# Patient Record
Sex: FEMALE | Race: BLACK OR AFRICAN AMERICAN | NOT HISPANIC OR LATINO
[De-identification: names, ages, dates, MRNs, and addresses within clinical notes are randomized per-mention and may not be internally consistent; named-entity substitution may affect disease eponyms.]

---

## 2018-02-13 ENCOUNTER — RESULT REVIEW (OUTPATIENT)
Age: 38
End: 2018-02-13

## 2018-08-30 ENCOUNTER — APPOINTMENT (OUTPATIENT)
Dept: GASTROENTEROLOGY | Facility: CLINIC | Age: 38
End: 2018-08-30
Payer: COMMERCIAL

## 2018-08-30 VITALS
TEMPERATURE: 98.1 F | RESPIRATION RATE: 16 BRPM | HEIGHT: 61 IN | BODY MASS INDEX: 36.06 KG/M2 | WEIGHT: 191 LBS | HEART RATE: 70 BPM | OXYGEN SATURATION: 97 % | SYSTOLIC BLOOD PRESSURE: 127 MMHG | DIASTOLIC BLOOD PRESSURE: 69 MMHG

## 2018-08-30 PROCEDURE — 99204 OFFICE O/P NEW MOD 45 MIN: CPT

## 2018-08-30 RX ORDER — IRON SUCROSE 20 MG/ML
20 INJECTION, SOLUTION INTRAVENOUS
Qty: 50 | Refills: 0 | Status: ACTIVE | COMMUNITY
Start: 2018-07-03

## 2018-08-30 RX ORDER — TERCONAZOLE 8 MG/G
0.8 CREAM VAGINAL
Qty: 20 | Refills: 0 | Status: ACTIVE | COMMUNITY
Start: 2018-06-26

## 2018-08-30 RX ORDER — NYSTATIN 100000 [USP'U]/G
100000 CREAM TOPICAL
Qty: 30 | Refills: 0 | Status: ACTIVE | COMMUNITY
Start: 2018-06-29

## 2018-08-30 RX ORDER — POLYETHYLENE GLYCOL 3350 17 G/17G
17 POWDER, FOR SOLUTION ORAL
Qty: 238 | Refills: 0 | Status: ACTIVE | COMMUNITY
Start: 2018-08-30 | End: 1900-01-01

## 2018-08-30 RX ORDER — FLUCONAZOLE 150 MG/1
150 TABLET ORAL
Qty: 1 | Refills: 0 | Status: ACTIVE | COMMUNITY
Start: 2018-06-29

## 2018-08-30 RX ORDER — FAMOTIDINE 20 MG/1
20 TABLET, FILM COATED ORAL
Qty: 30 | Refills: 0 | Status: ACTIVE | COMMUNITY
Start: 2018-04-06

## 2018-10-08 ENCOUNTER — RESULT REVIEW (OUTPATIENT)
Age: 38
End: 2018-10-08

## 2018-10-08 ENCOUNTER — OUTPATIENT (OUTPATIENT)
Dept: OUTPATIENT SERVICES | Facility: HOSPITAL | Age: 38
LOS: 1 days | End: 2018-10-08
Payer: COMMERCIAL

## 2018-10-08 DIAGNOSIS — D64.9 ANEMIA, UNSPECIFIED: ICD-10-CM

## 2018-10-08 DIAGNOSIS — Z98.89 OTHER SPECIFIED POSTPROCEDURAL STATES: Chronic | ICD-10-CM

## 2018-10-08 DIAGNOSIS — Z98.84 BARIATRIC SURGERY STATUS: Chronic | ICD-10-CM

## 2018-10-08 LAB — HCG UR QL: NEGATIVE — SIGNIFICANT CHANGE UP

## 2018-10-08 PROCEDURE — 88312 SPECIAL STAINS GROUP 1: CPT | Mod: 26

## 2018-10-08 PROCEDURE — 88305 TISSUE EXAM BY PATHOLOGIST: CPT | Mod: 26

## 2018-10-08 PROCEDURE — 43239 EGD BIOPSY SINGLE/MULTIPLE: CPT

## 2018-10-08 PROCEDURE — 44361 SMALL BOWEL ENDOSCOPY/BIOPSY: CPT

## 2018-10-08 PROCEDURE — 81025 URINE PREGNANCY TEST: CPT

## 2018-10-08 PROCEDURE — 45378 DIAGNOSTIC COLONOSCOPY: CPT

## 2018-10-08 PROCEDURE — 88312 SPECIAL STAINS GROUP 1: CPT

## 2018-10-08 PROCEDURE — 88305 TISSUE EXAM BY PATHOLOGIST: CPT

## 2020-06-17 ENCOUNTER — RESULT REVIEW (OUTPATIENT)
Age: 40
End: 2020-06-17

## 2021-03-02 ENCOUNTER — APPOINTMENT (OUTPATIENT)
Dept: PLASTIC SURGERY | Facility: CLINIC | Age: 41
End: 2021-03-02

## 2021-03-25 ENCOUNTER — APPOINTMENT (OUTPATIENT)
Dept: PLASTIC SURGERY | Facility: CLINIC | Age: 41
End: 2021-03-25
Payer: COMMERCIAL

## 2021-03-25 VITALS
WEIGHT: 185 LBS | DIASTOLIC BLOOD PRESSURE: 61 MMHG | SYSTOLIC BLOOD PRESSURE: 110 MMHG | HEART RATE: 75 BPM | HEIGHT: 61 IN | OXYGEN SATURATION: 98 % | TEMPERATURE: 97.5 F | BODY MASS INDEX: 34.93 KG/M2

## 2021-03-25 DIAGNOSIS — N62 HYPERTROPHY OF BREAST: ICD-10-CM

## 2021-03-25 DIAGNOSIS — N64.89 OTHER SPECIFIED DISORDERS OF BREAST: ICD-10-CM

## 2021-03-25 DIAGNOSIS — M25.519 PAIN IN UNSPECIFIED SHOULDER: ICD-10-CM

## 2021-03-25 DIAGNOSIS — M54.9 DORSALGIA, UNSPECIFIED: ICD-10-CM

## 2021-03-25 PROCEDURE — 99203 OFFICE O/P NEW LOW 30 MIN: CPT

## 2021-06-03 PROBLEM — M25.519 SHOULDER PAIN: Status: ACTIVE | Noted: 2021-06-03

## 2021-06-03 PROBLEM — M54.9 BACK PAIN: Status: ACTIVE | Noted: 2021-06-03

## 2021-06-03 NOTE — HISTORY OF PRESENT ILLNESS
[FreeTextEntry1] : Ms. ARTEMIO BOYER is a 41 yo female who presents with bilateral macromastia complaining of bilateral shoulder, neck, and back pain for many years.  She states that she has tried an array of supportive bras, including expensive specialty bras, and none of these have helped her.  She has tried Tylenol and ibuprofen which have not provided any relief.  She complains of severe asymmetry of her breasts making it difficult for her to wear clothing.   She complains that her large breasts cause her constant daily pain and affect her activities of daily life including making running, exercising, and many other activities very painful.  She wears a 38DD bra.  She has no family history of early breast cancer.  She is a non-smoker.  She has no previous history of any breast problems or breast surgeries.  She has no history of traumatic injuries or back surgery that could explain her complaints.  \par \par

## 2021-06-03 NOTE — ASSESSMENT
[FreeTextEntry1] : 41 yo female with intractable back, neck, and bilateral shoulder pain, breast asymmetry secondary to large ptotic breasts that interfere with the patient’s activities of normal daily living.  I feel that she is a good candidate for a breast reduction.  \par \par The patient was counseled on the risks, benefits and alternatives of bilateral breast reduction including the risks of infection, bleeding, seroma, hematoma, fat necrosis, decreased/increased /loss of nipple sensation, nipple necrosis, skin flap necrosis, fat necrosis, dehiscence, asymmetry.  The patient understands the risks, all questions were answered and the patient gave informed consent.  The patient wishes to proceed with surgery and will be scheduled for bilateral breast reduction.\par \par

## 2021-06-17 RX ORDER — OXYCODONE 5 MG/1
5 TABLET ORAL
Qty: 10 | Refills: 0 | Status: ACTIVE | COMMUNITY
Start: 2021-06-17 | End: 1900-01-01

## 2021-06-21 ENCOUNTER — APPOINTMENT (OUTPATIENT)
Dept: PLASTIC SURGERY | Facility: AMBULATORY SURGERY CENTER | Age: 41
End: 2021-06-21
Payer: COMMERCIAL

## 2021-06-21 ENCOUNTER — RESULT REVIEW (OUTPATIENT)
Age: 41
End: 2021-06-21

## 2021-06-21 PROCEDURE — 19318 BREAST REDUCTION: CPT | Mod: 50

## 2021-06-29 ENCOUNTER — APPOINTMENT (OUTPATIENT)
Dept: PLASTIC SURGERY | Facility: CLINIC | Age: 41
End: 2021-06-29
Payer: COMMERCIAL

## 2021-06-29 VITALS
WEIGHT: 174.04 LBS | SYSTOLIC BLOOD PRESSURE: 121 MMHG | HEIGHT: 61 IN | HEART RATE: 77 BPM | OXYGEN SATURATION: 98 % | TEMPERATURE: 97.4 F | BODY MASS INDEX: 32.86 KG/M2 | DIASTOLIC BLOOD PRESSURE: 81 MMHG

## 2021-06-29 PROCEDURE — 99024 POSTOP FOLLOW-UP VISIT: CPT

## 2021-06-29 NOTE — ASSESSMENT
[FreeTextEntry1] : 41 yo female s/p b/l breast reduction 6/21/21\par monitor for redness, swelling, fever, chills\par no heavy lifting or strenuous activity\par continue to wear soft, non wire bra\par all pt questions answered\par drains removed today without difficulty

## 2021-06-29 NOTE — PHYSICAL EXAM
[NI] : Normal [de-identified] : b/l breast soft and symmetrical \par incisions c/d/i\par no palpable fluid collections or signs of infection \par drains in place and functioning

## 2021-06-29 NOTE — HISTORY OF PRESENT ILLNESS
[FreeTextEntry1] : Ms. ARTEMIO BOYER is a 41 yo female who presents with bilateral macromastia complaining of bilateral shoulder, neck, and back pain for many years.  Pt is now s/p b/l breast reduction 6/21/21\par pt doing well\par Drains \par R - 10\par L - 25 \par denies fever, chills, LE pain/edema\par \par

## 2021-07-13 ENCOUNTER — APPOINTMENT (OUTPATIENT)
Dept: PLASTIC SURGERY | Facility: CLINIC | Age: 41
End: 2021-07-13
Payer: COMMERCIAL

## 2021-07-13 VITALS
HEIGHT: 61 IN | OXYGEN SATURATION: 99 % | HEART RATE: 64 BPM | SYSTOLIC BLOOD PRESSURE: 126 MMHG | BODY MASS INDEX: 33.42 KG/M2 | WEIGHT: 177 LBS | DIASTOLIC BLOOD PRESSURE: 82 MMHG | TEMPERATURE: 97.8 F

## 2021-07-13 PROCEDURE — 99024 POSTOP FOLLOW-UP VISIT: CPT

## 2021-07-13 NOTE — HISTORY OF PRESENT ILLNESS
[FreeTextEntry1] : Ms. ARTEMIO BOYER is a 39 yo female who presents with bilateral macromastia complaining of bilateral shoulder, neck, and back pain for many years.  Pt is now s/p b/l breast reduction 6/21/21\par pt doing well\par denies fever, chills, LE pain/edema\par \par

## 2021-07-13 NOTE — ASSESSMENT
[FreeTextEntry1] : 42 yo female s/p bilateral BRM 6/21/21\par pt doing well \par T point suture removed today without difficulty\par monitor for redness, swelling, fever, chills\par no heavy lifting or strenuous activity\par continue to wear soft, non wire bra\par all pt questions answered\par

## 2021-07-13 NOTE — PHYSICAL EXAM
[NI] : Normal [de-identified] : b/l breast soft and symmetrical \par incisions c/d/i\par no palpable fluid collections or signs of infection

## 2021-08-03 ENCOUNTER — APPOINTMENT (OUTPATIENT)
Dept: PLASTIC SURGERY | Facility: CLINIC | Age: 41
End: 2021-08-03
Payer: COMMERCIAL

## 2021-08-03 VITALS
TEMPERATURE: 97.3 F | OXYGEN SATURATION: 97 % | HEIGHT: 61 IN | DIASTOLIC BLOOD PRESSURE: 81 MMHG | BODY MASS INDEX: 33.99 KG/M2 | SYSTOLIC BLOOD PRESSURE: 125 MMHG | WEIGHT: 180 LBS | HEART RATE: 71 BPM

## 2021-08-03 PROCEDURE — 99024 POSTOP FOLLOW-UP VISIT: CPT

## 2021-08-04 NOTE — ASSESSMENT
[FreeTextEntry1] : 40 yo female s/p b/l BRM 6/21/21\par pt doing well\par prenio removed\par scar massages discussed\par apply SPF 30 or higher\par all questions answered\par no restrictions

## 2021-08-04 NOTE — PHYSICAL EXAM
[NI] : Normal [de-identified] : b/l breast soft and symmetrical \par incisions c/d/i\par no palpable fluid collections or signs of infection \par there is a small T point dehiscence of the right breast 2 mm x 2 mm, minmal fibrin f

## 2021-10-01 ENCOUNTER — OUTPATIENT (OUTPATIENT)
Dept: OUTPATIENT SERVICES | Facility: HOSPITAL | Age: 41
LOS: 1 days | End: 2021-10-01
Payer: COMMERCIAL

## 2021-10-01 ENCOUNTER — RESULT REVIEW (OUTPATIENT)
Age: 41
End: 2021-10-01

## 2021-10-01 ENCOUNTER — APPOINTMENT (OUTPATIENT)
Dept: ULTRASOUND IMAGING | Facility: CLINIC | Age: 41
End: 2021-10-01
Payer: COMMERCIAL

## 2021-10-01 DIAGNOSIS — Z98.84 BARIATRIC SURGERY STATUS: Chronic | ICD-10-CM

## 2021-10-01 DIAGNOSIS — Z98.89 OTHER SPECIFIED POSTPROCEDURAL STATES: Chronic | ICD-10-CM

## 2021-10-01 DIAGNOSIS — Z98.890 OTHER SPECIFIED POSTPROCEDURAL STATES: ICD-10-CM

## 2021-10-01 PROCEDURE — 76641 ULTRASOUND BREAST COMPLETE: CPT | Mod: 26,50

## 2021-10-01 PROCEDURE — 76641 ULTRASOUND BREAST COMPLETE: CPT

## 2021-10-05 ENCOUNTER — APPOINTMENT (OUTPATIENT)
Dept: PLASTIC SURGERY | Facility: CLINIC | Age: 41
End: 2021-10-05
Payer: COMMERCIAL

## 2021-10-05 VITALS
WEIGHT: 293 LBS | HEART RATE: 82 BPM | SYSTOLIC BLOOD PRESSURE: 118 MMHG | BODY MASS INDEX: 55.32 KG/M2 | OXYGEN SATURATION: 97 % | TEMPERATURE: 97.1 F | DIASTOLIC BLOOD PRESSURE: 72 MMHG | HEIGHT: 61 IN | RESPIRATION RATE: 16 BRPM

## 2021-10-05 DIAGNOSIS — Z98.890 OTHER SPECIFIED POSTPROCEDURAL STATES: ICD-10-CM

## 2021-10-05 PROCEDURE — 99212 OFFICE O/P EST SF 10 MIN: CPT

## 2021-10-05 NOTE — ASSESSMENT
[FreeTextEntry1] : 40 yo female s/p b/l BRM 6/21/21\par pt doing well\par discussed scar massages at today's visit\par no restrictions\par all questions answered

## 2021-10-05 NOTE — PHYSICAL EXAM
[NI] : Normal [de-identified] : b/l breast soft and symmetrical \par incisions c/d/i\par no palpable fluid collections or signs of infection \par there is a palpable area of likely scar tissue laterally to the vertical incision on the left breast, no surrounding signs of infection

## 2022-12-12 ENCOUNTER — RESULT REVIEW (OUTPATIENT)
Age: 42
End: 2022-12-12

## 2022-12-23 ENCOUNTER — APPOINTMENT (OUTPATIENT)
Dept: ORTHOPEDIC SURGERY | Facility: CLINIC | Age: 42
End: 2022-12-23
Payer: COMMERCIAL

## 2022-12-23 ENCOUNTER — NON-APPOINTMENT (OUTPATIENT)
Age: 42
End: 2022-12-23

## 2022-12-23 VITALS — WEIGHT: 182 LBS | HEIGHT: 61 IN | BODY MASS INDEX: 34.36 KG/M2

## 2022-12-23 DIAGNOSIS — M54.12 RADICULOPATHY, CERVICAL REGION: ICD-10-CM

## 2022-12-23 DIAGNOSIS — M54.2 CERVICALGIA: ICD-10-CM

## 2022-12-23 PROCEDURE — 99203 OFFICE O/P NEW LOW 30 MIN: CPT
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
## 2023-01-09 NOTE — PHYSICAL EXAM
[NL (45)] : right lateral flexion 45 degrees [NL (80)] : right lateral rotation 80 degrees [Rotation to left] : rotation to left [Biceps 2+] : biceps 2+ [Triceps 2+] : triceps 2+ [Brachioradialis 2+] : brachioradialis 2+ [de-identified] : A. General\par 1. .vit:\par Constitutional:\par - General Appearance:\par Unremarkable\par Body Habitus\par Well Developed\par Well Nourished\par Body Habitus\par No Deformities\par Well Groomed\par Ability To communicate:\par Normal\par Neurologic:\par Global sensation is intact to upper and lower extremities. See examination of Neck and/or Spine\par for exceptions.\par Orientation to Time, Place and Person is: Normal\par Mood And Affect is Normal\par Skin:\par - Head/Face, Right Upper/Lower Extremity, Left Upper/Lower Extremity: Normal\par See Examination of Neck and/or Spine for exceptions\par Cardiovascular:\par Peripheral Cardiovascular System is Normal\par Palpation of Lymph Nodes:\par Normal Palpation of lymph nodes in: Axilla, Cervical, Inguinal\par Abnormal Palpation of lymph nodes in: None [de-identified] : left lateral rotation 45 degrees [] : no swelling [FreeTextEntry8] : upper thoracic region tenderness on the left  [FreeTextEntry3] : well healed scars b/l arms

## 2023-01-09 NOTE — ASSESSMENT
[FreeTextEntry1] : 41 y/o F with cervicalgia after a work place injury on 4/9/22. Patient has failed all forms of conservative treatment including rest, OTC,PT and HEP, and is now indicated for MRI of cervical spine to evaluate disc herniation. Discussed possible referral to pain mgmt pending MRI results. Patient will remain OOW. Follow up in 1-2 weeks to review imaging. \par \par Prior to appointment and during encounter with patient extensive medical records were reviewed including but not limited to, hospital records, outpatient records, imaging results, and lab data.During this appointment the patient was examined, diagnoses were discussed and explained in a face to face manner. In addition extensive time was spent reviewing aforementioned diagnostic studies. Counseling including abnormal image results, differential diagnoses, treatment options, risk and benefits, lifestyle changes, current condition, and current medications was performed.Patient's comments, questions, and concerns were addressed and patient verbalized understanding. Based on this patient's presentation at our office, which is an orthopedic spine surgeon's office, this patient inherently / intrinsically has a risk, however minute, of developing issues such as Cauda equina syndrome, bowel and bladder changes, or progression of motor or neurological deficits such as paralysis which may be permanent.\par \par I, Olya Hernández, attest that this documentation has been prepared under the direction and in the presence of provider Dr. Page.\par \par

## 2023-01-22 ENCOUNTER — FORM ENCOUNTER (OUTPATIENT)
Age: 43
End: 2023-01-22

## 2023-02-03 ENCOUNTER — APPOINTMENT (OUTPATIENT)
Dept: ORTHOPEDIC SURGERY | Facility: CLINIC | Age: 43
End: 2023-02-03
Payer: COMMERCIAL

## 2023-02-03 VITALS — WEIGHT: 182 LBS | BODY MASS INDEX: 34.36 KG/M2 | HEIGHT: 61 IN

## 2023-02-03 PROCEDURE — 99214 OFFICE O/P EST MOD 30 MIN: CPT

## 2023-02-04 ENCOUNTER — RESULT REVIEW (OUTPATIENT)
Age: 43
End: 2023-02-04

## 2023-02-22 ENCOUNTER — APPOINTMENT (OUTPATIENT)
Dept: ORTHOPEDIC SURGERY | Facility: CLINIC | Age: 43
End: 2023-02-22
Payer: COMMERCIAL

## 2023-02-22 PROCEDURE — 99214 OFFICE O/P EST MOD 30 MIN: CPT

## 2023-02-22 NOTE — DATA REVIEWED
[FreeTextEntry1] : On my interpretation of these images from \par C2-3:\par C3-4:\par C4-5:\par C5-6:\par C6-7:\par C7-T1:

## 2023-02-22 NOTE — ASSESSMENT
[FreeTextEntry1] : 43 y/o F with cervicalgia after a work place injury on 4/9/22. Patient has failed all forms of conservative treatment including rest, OTC, PT and HEP, and is now indicated for MRI of cervical spine to evaluate disc herniation. Discussed possible referral to pain mgmt pending MRI results. Patient will remain OOW. Follow up in 6 weeks to review imaging. \par \par Prior to appointment and during encounter with patient extensive medical records were reviewed including but not limited to, hospital records, outpatient records, imaging results, and lab data.During this appointment the patient was examined, diagnoses were discussed and explained in a face to face manner. In addition extensive time was spent reviewing aforementioned diagnostic studies. Counseling including abnormal image results, differential diagnoses, treatment options, risk and benefits, lifestyle changes, current condition, and current medications was performed.Patient's comments, questions, and concerns were addressed and patient verbalized understanding. Based on this patient's presentation at our office, which is an orthopedic spine surgeon's office, this patient inherently / intrinsically has a risk, however minute, of developing issues such as Cauda equina syndrome, bowel and bladder changes, or progression of motor or neurological deficits such as paralysis which may be permanent.\par \par I, Lanny Hanson, attest that this documentation has been prepared under the direction and in the presence of provider Pieter Page MD. \par \par
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
## 2023-02-22 NOTE — PHYSICAL EXAM
[Rotation to left] : rotation to left [Biceps 2+] : biceps 2+ [Triceps 2+] : triceps 2+ [Brachioradialis 2+] : brachioradialis 2+ [FreeTextEntry8] : upper thoracic region tenderness on the left  [FreeTextEntry3] : well healed scars b/l arms [Extension] : extension [de-identified] : A. General\par 1. .vit:\par Constitutional:\par - General Appearance:\par Unremarkable\par Body Habitus\par Well Developed\par Well Nourished\par Body Habitus\par No Deformities\par Well Groomed\par Ability To communicate:\par Normal\par Neurologic:\par Global sensation is intact to upper and lower extremities. See examination of Neck and/or Spine\par for exceptions.\par Orientation to Time, Place and Person is: Normal\par Mood And Affect is Normal\par Skin:\par - Head/Face, Right Upper/Lower Extremity, Left Upper/Lower Extremity: Normal\par See Examination of Neck and/or Spine for exceptions\par Cardiovascular:\par Peripheral Cardiovascular System is Normal\par Palpation of Lymph Nodes:\par Normal Palpation of lymph nodes in: Axilla, Cervical, Inguinal\par Abnormal Palpation of lymph nodes in: None [] : negative Rivera reflex [TWNoteComboBox7] : forward flexion 20 degrees [de-identified] : extension 20 degrees [de-identified] : left lateral rotation 45 degrees [TWNoteComboBox6] : right lateral rotation 75 degrees

## 2023-03-08 NOTE — DATA REVIEWED
[FreeTextEntry1] : On my interpretation of these images from [facility] on 2/4/23:\par C2-3: normal \par C3-4: mild central disc herniation and mild central stenosis \par C4-5: normal \par C5-6: normal \par C6-7: normal \par C7-T1: normal
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
## 2023-03-08 NOTE — PHYSICAL EXAM
[Extension] : extension [Rotation to left] : rotation to left [Biceps 2+] : biceps 2+ [Triceps 2+] : triceps 2+ [Brachioradialis 2+] : brachioradialis 2+ [de-identified] : A. General\par 1. .vit:\par Constitutional:\par - General Appearance:\par Unremarkable\par Body Habitus\par Well Developed\par Well Nourished\par Body Habitus\par No Deformities\par Well Groomed\par Ability To communicate:\par Normal\par Neurologic:\par Global sensation is intact to upper and lower extremities. See examination of Neck and/or Spine\par for exceptions.\par Orientation to Time, Place and Person is: Normal\par Mood And Affect is Normal\par Skin:\par - Head/Face, Right Upper/Lower Extremity, Left Upper/Lower Extremity: Normal\par See Examination of Neck and/or Spine for exceptions\par Cardiovascular:\par Peripheral Cardiovascular System is Normal\par Palpation of Lymph Nodes:\par Normal Palpation of lymph nodes in: Axilla, Cervical, Inguinal\par Abnormal Palpation of lymph nodes in: None [] : negative Rivera reflex [FreeTextEntry8] : L>R [TWNoteComboBox7] : forward flexion 20 degrees [de-identified] : extension 20 degrees [de-identified] : left lateral rotation 45 degrees [TWNoteComboBox6] : right lateral rotation 75 degrees

## 2023-03-23 ENCOUNTER — APPOINTMENT (OUTPATIENT)
Dept: PAIN MANAGEMENT | Facility: CLINIC | Age: 43
End: 2023-03-23

## 2023-03-27 ENCOUNTER — APPOINTMENT (OUTPATIENT)
Dept: ORTHOPEDIC SURGERY | Facility: CLINIC | Age: 43
End: 2023-03-27

## 2023-05-25 ENCOUNTER — APPOINTMENT (OUTPATIENT)
Dept: OBGYN | Facility: CLINIC | Age: 43
End: 2023-05-25
Payer: COMMERCIAL

## 2023-05-25 VITALS
BODY MASS INDEX: 28.32 KG/M2 | WEIGHT: 150 LBS | SYSTOLIC BLOOD PRESSURE: 121 MMHG | HEIGHT: 61 IN | DIASTOLIC BLOOD PRESSURE: 80 MMHG

## 2023-05-25 DIAGNOSIS — D50.8 OTHER IRON DEFICIENCY ANEMIAS: ICD-10-CM

## 2023-05-25 DIAGNOSIS — N94.6 DYSMENORRHEA, UNSPECIFIED: ICD-10-CM

## 2023-05-25 PROCEDURE — 99203 OFFICE O/P NEW LOW 30 MIN: CPT

## 2023-05-25 RX ORDER — MISOPROSTOL 200 UG/1
200 TABLET ORAL
Qty: 2 | Refills: 0 | Status: ACTIVE | COMMUNITY
Start: 2023-05-25 | End: 1900-01-01

## 2023-05-25 RX ORDER — TRANEXAMIC ACID 650 MG/1
650 TABLET ORAL 3 TIMES DAILY
Qty: 30 | Refills: 3 | Status: ACTIVE | COMMUNITY
Start: 2023-05-25 | End: 1900-01-01

## 2023-05-25 NOTE — PLAN
[FreeTextEntry1] : I spent 20 minutes with patient discussing options: depo, Mirena IUD, hysteroscopy d&c with endometrial ablation vs hysterectomy\par \par Patient opts for hysterectomy/bs\par \par Will set up endosee hysteroscopy and EMBx to rule out endometrial malignancy\par obtain medical records from [facility]\par \par Patient with previous  deliveries, abdominoplasty, and cholecystectomy\par \par Rx cytotec   insert bedtime prior to procedure\par Rx Lysteda for heavy bleeding and cramping with menses\par \par ER warnings given\par \par Last pap 10/22 wnl per patient

## 2023-06-08 ENCOUNTER — APPOINTMENT (OUTPATIENT)
Dept: ANTEPARTUM | Facility: CLINIC | Age: 43
End: 2023-06-08

## 2023-06-08 ENCOUNTER — APPOINTMENT (OUTPATIENT)
Dept: OBGYN | Facility: CLINIC | Age: 43
End: 2023-06-08
Payer: COMMERCIAL

## 2023-06-08 VITALS
WEIGHT: 150 LBS | SYSTOLIC BLOOD PRESSURE: 139 MMHG | HEIGHT: 61 IN | BODY MASS INDEX: 28.32 KG/M2 | DIASTOLIC BLOOD PRESSURE: 92 MMHG

## 2023-06-08 DIAGNOSIS — N92.0 EXCESSIVE AND FREQUENT MENSTRUATION WITH REGULAR CYCLE: ICD-10-CM

## 2023-06-08 LAB
HCG UR QL: NEGATIVE
QUALITY CONTROL: YES

## 2023-06-08 PROCEDURE — 58558Z: CUSTOM

## 2023-06-08 NOTE — PROCEDURE
[Hysteroscopy] : Hysteroscopy [Time out performed] : Pre-procedure time out performed.  Patient's name, date of birth and procedure confirmed. [Consent Obtained] : Consent obtained [Abnormal uterine bleeding] : abnormal uterine bleeding [Risks] : risks [Benefits] : benefits [Alternatives] : alternatives [Patient] : patient [Infection] : infection [Bleeding] : bleeding [Sent to Pathology] : specimen was placed in buffered formalin and sent for pathology [Antibiotics given] : antibiotics not given [Hemostasis obtained] : hemostasis obtained [Tolerated Well] : Patient tolerated the procedure well [de-identified] : speculum inserted and cervix identified\par vaginal vault and cervix prepped with betadine\par anterior lip of cervix grasped with single tooth tenaculum\par uterus sounded to  9  cm\par Endosee hysteroscope inserted and endometrial cavity evaluated  Fibroid anterior wall pressing into cavity\par both ostia identified\par no obvious malignancy\par Pipelle inserted and endometrial biopsy obtained [de-identified] : endometrial biopsy via Pipelle

## 2023-06-08 NOTE — PLAN
[FreeTextEntry1] : Endosee hysteroscopy and EMBx via pipelle\par follow up results\par \par patient for hysterectomy\par \par Dr Krishna

## 2023-06-16 ENCOUNTER — NON-APPOINTMENT (OUTPATIENT)
Age: 43
End: 2023-06-16

## 2023-06-16 LAB — CORE LAB BIOPSY: NORMAL

## 2023-07-07 ENCOUNTER — NON-APPOINTMENT (OUTPATIENT)
Age: 43
End: 2023-07-07

## 2023-07-10 ENCOUNTER — NON-APPOINTMENT (OUTPATIENT)
Age: 43
End: 2023-07-10

## 2023-10-13 ENCOUNTER — APPOINTMENT (OUTPATIENT)
Dept: OBGYN | Facility: HOSPITAL | Age: 43
End: 2023-10-13

## 2023-10-13 ENCOUNTER — RESULT REVIEW (OUTPATIENT)
Age: 43
End: 2023-10-13

## 2023-10-25 ENCOUNTER — APPOINTMENT (OUTPATIENT)
Dept: OBGYN | Facility: CLINIC | Age: 43
End: 2023-10-25
Payer: COMMERCIAL

## 2023-10-25 ENCOUNTER — NON-APPOINTMENT (OUTPATIENT)
Age: 43
End: 2023-10-25

## 2023-10-25 VITALS
SYSTOLIC BLOOD PRESSURE: 110 MMHG | DIASTOLIC BLOOD PRESSURE: 68 MMHG | HEIGHT: 61 IN | BODY MASS INDEX: 28.32 KG/M2 | WEIGHT: 150 LBS

## 2023-10-25 PROCEDURE — 99024 POSTOP FOLLOW-UP VISIT: CPT

## 2023-11-16 ENCOUNTER — APPOINTMENT (OUTPATIENT)
Dept: OBGYN | Facility: CLINIC | Age: 43
End: 2023-11-16
Payer: COMMERCIAL

## 2023-11-16 VITALS
BODY MASS INDEX: 27.19 KG/M2 | SYSTOLIC BLOOD PRESSURE: 131 MMHG | DIASTOLIC BLOOD PRESSURE: 90 MMHG | HEIGHT: 61 IN | WEIGHT: 144 LBS

## 2023-11-16 DIAGNOSIS — D21.9 BENIGN NEOPLASM OF CONNECTIVE AND OTHER SOFT TISSUE, UNSPECIFIED: ICD-10-CM

## 2023-11-16 PROCEDURE — 99024 POSTOP FOLLOW-UP VISIT: CPT

## 2023-12-14 ENCOUNTER — APPOINTMENT (OUTPATIENT)
Dept: OBGYN | Facility: CLINIC | Age: 43
End: 2023-12-14
Payer: COMMERCIAL

## 2023-12-14 VITALS
WEIGHT: 144 LBS | HEART RATE: 78 BPM | BODY MASS INDEX: 27.19 KG/M2 | HEIGHT: 61 IN | DIASTOLIC BLOOD PRESSURE: 72 MMHG | SYSTOLIC BLOOD PRESSURE: 110 MMHG

## 2023-12-14 PROCEDURE — 99212 OFFICE O/P EST SF 10 MIN: CPT | Mod: 24

## 2023-12-14 NOTE — HISTORY OF PRESENT ILLNESS
[FreeTextEntry1] : s/p [facility]/BS  Patient denies fever/chills, nausea/emesis, dyspnea, dizziness, vaginal bleeding, or severe abdominal/pelvic pain Reports some discomfort to RLQ, but not severe  surgery 10/13/23 Patient ready to return to work full duty

## 2023-12-14 NOTE — PHYSICAL EXAM
[Chaperone Present] : A chaperone was present in the examining room during all aspects of the physical examination [Appropriately responsive] : appropriately responsive [Alert] : alert [No Acute Distress] : no acute distress [Soft] : soft [Non-tender] : non-tender [Non-distended] : non-distended [No Mass] : no mass [Oriented x3] : oriented x3

## 2024-01-11 ENCOUNTER — APPOINTMENT (OUTPATIENT)
Dept: OBGYN | Facility: CLINIC | Age: 44
End: 2024-01-11
Payer: COMMERCIAL

## 2024-01-11 VITALS
WEIGHT: 144 LBS | SYSTOLIC BLOOD PRESSURE: 126 MMHG | DIASTOLIC BLOOD PRESSURE: 68 MMHG | HEIGHT: 61 IN | BODY MASS INDEX: 27.19 KG/M2

## 2024-01-11 DIAGNOSIS — N83.201 UNSPECIFIED OVARIAN CYST, RIGHT SIDE: ICD-10-CM

## 2024-01-11 DIAGNOSIS — Z90.710 ACQUIRED ABSENCE OF BOTH CERVIX AND UTERUS: ICD-10-CM

## 2024-01-11 DIAGNOSIS — R10.2 PELVIC AND PERINEAL PAIN: ICD-10-CM

## 2024-01-11 PROCEDURE — 99213 OFFICE O/P EST LOW 20 MIN: CPT | Mod: 24

## 2024-01-11 NOTE — HISTORY OF PRESENT ILLNESS
[FreeTextEntry1] : s/p [facility] 10/13/23   patient report sudden onset of severe pelvic pain / RLQ on 12/13/23 and presented to [facility] ED Ct scan demonstrated right ovarian cyst   patient believes it was 2.2 cm  Denies fever/chills, nausea/emesis, dyspnea, or dizziness reports some discomfort to RLQ region  patient may return to work 1/16/24  she will have a pelvic ultrasound to re evaluate adnexa

## 2024-01-11 NOTE — PLAN
[FreeTextEntry1] : Pelvic US  Motrin prn  may return to work 1/16/24  ER warnings given  follow up gyn prn

## 2024-01-11 NOTE — PHYSICAL EXAM
[Appropriately responsive] : appropriately responsive [Alert] : alert [No Acute Distress] : no acute distress [Soft] : soft [Non-distended] : non-distended [No Mass] : no mass [Oriented x3] : oriented x3 [FreeTextEntry7] : no guarding  no rebound [Declined] : Patient declined rectal exam

## 2024-01-19 ENCOUNTER — ASOB RESULT (OUTPATIENT)
Age: 44
End: 2024-01-19

## 2024-01-19 ENCOUNTER — APPOINTMENT (OUTPATIENT)
Dept: ANTEPARTUM | Facility: CLINIC | Age: 44
End: 2024-01-19
Payer: COMMERCIAL

## 2024-01-19 PROCEDURE — 76830 TRANSVAGINAL US NON-OB: CPT

## 2024-01-19 PROCEDURE — 76857 US EXAM PELVIC LIMITED: CPT | Mod: 59

## 2025-07-03 ENCOUNTER — APPOINTMENT (OUTPATIENT)
Dept: OBGYN | Facility: CLINIC | Age: 45
End: 2025-07-03
Payer: COMMERCIAL

## 2025-07-03 VITALS — SYSTOLIC BLOOD PRESSURE: 116 MMHG | DIASTOLIC BLOOD PRESSURE: 88 MMHG

## 2025-07-03 VITALS
BODY MASS INDEX: 30.21 KG/M2 | WEIGHT: 160 LBS | SYSTOLIC BLOOD PRESSURE: 150 MMHG | DIASTOLIC BLOOD PRESSURE: 100 MMHG | HEIGHT: 61 IN

## 2025-07-03 PROBLEM — Z01.419 WOMEN'S ANNUAL ROUTINE GYNECOLOGICAL EXAMINATION: Status: ACTIVE | Noted: 2025-07-03

## 2025-07-03 PROCEDURE — 99396 PREV VISIT EST AGE 40-64: CPT

## 2025-07-06 LAB — HPV HIGH+LOW RISK DNA PNL CVX: NOT DETECTED

## 2025-07-09 LAB — CYTOLOGY CVX/VAG DOC THIN PREP: NORMAL

## 2025-07-23 DIAGNOSIS — R92.8 OTHER ABNORMAL AND INCONCLUSIVE FINDINGS ON DIAGNOSTIC IMAGING OF BREAST: ICD-10-CM

## 2025-07-31 ENCOUNTER — NON-APPOINTMENT (OUTPATIENT)
Age: 45
End: 2025-07-31